# Patient Record
Sex: FEMALE | Race: WHITE | NOT HISPANIC OR LATINO | Employment: FULL TIME | URBAN - METROPOLITAN AREA
[De-identification: names, ages, dates, MRNs, and addresses within clinical notes are randomized per-mention and may not be internally consistent; named-entity substitution may affect disease eponyms.]

---

## 2020-10-28 ENCOUNTER — OFFICE VISIT (OUTPATIENT)
Dept: NEUROSURGERY | Facility: CLINIC | Age: 30
End: 2020-10-28

## 2020-10-28 VITALS — RESPIRATION RATE: 15 BRPM | WEIGHT: 188.8 LBS | HEIGHT: 68 IN | TEMPERATURE: 97.3 F | BODY MASS INDEX: 28.61 KG/M2

## 2020-10-28 DIAGNOSIS — S39.012A STRAIN OF LUMBAR REGION, INITIAL ENCOUNTER: ICD-10-CM

## 2020-10-28 DIAGNOSIS — M54.9 MECHANICAL BACK PAIN: Primary | ICD-10-CM

## 2020-10-28 PROCEDURE — 99243 OFF/OP CNSLTJ NEW/EST LOW 30: CPT | Performed by: NEUROLOGICAL SURGERY

## 2020-10-28 RX ORDER — IBUPROFEN AND FAMOTIDINE 800; 26.6 MG/1; MG/1
TABLET, COATED ORAL
COMMUNITY

## 2020-10-28 RX ORDER — RIZATRIPTAN BENZOATE 10 MG/1
10 TABLET ORAL ONCE AS NEEDED
COMMUNITY

## 2020-10-28 RX ORDER — VORTIOXETINE 10 MG/1
TABLET, FILM COATED ORAL
COMMUNITY

## 2020-10-28 RX ORDER — TIZANIDINE 4 MG/1
4 TABLET ORAL NIGHTLY PRN
COMMUNITY

## 2020-10-28 RX ORDER — BUSPIRONE HYDROCHLORIDE 5 MG/1
5 TABLET ORAL 3 TIMES DAILY
COMMUNITY

## 2020-10-28 RX ORDER — FERROUS SULFATE 325(65) MG
325 TABLET ORAL
COMMUNITY

## 2020-10-28 RX ORDER — DIPHENHYDRAMINE HCL 25 MG
25 TABLET ORAL EVERY 6 HOURS PRN
COMMUNITY

## 2020-10-28 RX ORDER — LEVOCETIRIZINE DIHYDROCHLORIDE 5 MG/1
5 TABLET, FILM COATED ORAL EVERY EVENING
COMMUNITY
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
